# Patient Record
Sex: MALE | Race: WHITE | NOT HISPANIC OR LATINO | URBAN - NONMETROPOLITAN AREA
[De-identification: names, ages, dates, MRNs, and addresses within clinical notes are randomized per-mention and may not be internally consistent; named-entity substitution may affect disease eponyms.]

---

## 2018-11-21 ENCOUNTER — OPTICAL OFFICE (OUTPATIENT)
Dept: URBAN - NONMETROPOLITAN AREA CLINIC 4 | Facility: CLINIC | Age: 43
Setting detail: OPHTHALMOLOGY
End: 2018-11-21
Payer: COMMERCIAL

## 2018-11-21 ENCOUNTER — DOCTOR'S OFFICE (OUTPATIENT)
Dept: URBAN - NONMETROPOLITAN AREA CLINIC 1 | Facility: CLINIC | Age: 43
Setting detail: OPHTHALMOLOGY
End: 2018-11-21
Payer: COMMERCIAL

## 2018-11-21 DIAGNOSIS — Z01.00: ICD-10-CM

## 2018-11-21 DIAGNOSIS — H52.13: ICD-10-CM

## 2018-11-21 DIAGNOSIS — H52.223: ICD-10-CM

## 2018-11-21 PROCEDURE — 92004 COMPRE OPH EXAM NEW PT 1/>: CPT | Performed by: OPTOMETRIST

## 2018-11-21 PROCEDURE — V2020 VISION SVCS FRAMES PURCHASES: HCPCS | Performed by: OPTOMETRIST

## 2018-11-21 PROCEDURE — V2203 LENS SPHCYL BIFOCAL 4.00D/.1: HCPCS | Performed by: OPTOMETRIST

## 2018-11-21 ASSESSMENT — REFRACTION_AUTOREFRACTION
OD_CYLINDER: -0.25
OD_SPHERE: -0.50
OD_AXIS: 062
OS_SPHERE: -0.75
OS_AXIS: 132
OS_CYLINDER: -0.25

## 2018-11-21 ASSESSMENT — REFRACTION_MANIFEST
OU_VA: 20/
OS_VA2: 20/20-2
OD_VA3: 20/
OD_VA1: 20/20-2
OS_VA1: 20/
OD_CYLINDER: -0.25
OD_VA2: 20/20-2
OS_VA3: 20/
OS_ADD: +1.25
OS_AXIS: 130
OD_SPHERE: -0.50
OS_CYLINDER: -0.25
OS_VA1: 20/20-2
OS_VA3: 20/
OU_VA: 20/
OD_ADD: +1.25
OS_SPHERE: -0.75
OD_AXIS: 060
OD_VA3: 20/
OS_VA2: 20/
OD_VA2: 20/
OD_VA1: 20/

## 2018-11-21 ASSESSMENT — REFRACTION_CURRENTRX
OS_OVR_VA: 20/
OD_OVR_VA: 20/
OD_OVR_VA: 20/
OS_OVR_VA: 20/
OD_OVR_VA: 20/
OS_OVR_VA: 20/

## 2018-11-21 ASSESSMENT — VISUAL ACUITY
OD_BCVA: 20/40-1
OS_BCVA: 20/25

## 2018-11-21 ASSESSMENT — SPHEQUIV_DERIVED
OS_SPHEQUIV: -0.875
OD_SPHEQUIV: -0.625
OD_SPHEQUIV: -0.625
OS_SPHEQUIV: -0.875

## 2018-11-21 ASSESSMENT — CONFRONTATIONAL VISUAL FIELD TEST (CVF)
OS_FINDINGS: FULL
OD_FINDINGS: FULL

## 2020-09-11 ENCOUNTER — DOCTOR'S OFFICE (OUTPATIENT)
Dept: URBAN - NONMETROPOLITAN AREA CLINIC 1 | Facility: CLINIC | Age: 45
Setting detail: OPHTHALMOLOGY
End: 2020-09-11
Payer: COMMERCIAL

## 2020-09-11 ENCOUNTER — OPTICAL OFFICE (OUTPATIENT)
Dept: URBAN - NONMETROPOLITAN AREA CLINIC 4 | Facility: CLINIC | Age: 45
Setting detail: OPHTHALMOLOGY
End: 2020-09-11
Payer: COMMERCIAL

## 2020-09-11 ENCOUNTER — RX ONLY (RX ONLY)
Age: 45
End: 2020-09-11

## 2020-09-11 DIAGNOSIS — H52.13: ICD-10-CM

## 2020-09-11 DIAGNOSIS — H52.223: ICD-10-CM

## 2020-09-11 DIAGNOSIS — Z01.00: ICD-10-CM

## 2020-09-11 DIAGNOSIS — H52.4: ICD-10-CM

## 2020-09-11 PROCEDURE — 92014 COMPRE OPH EXAM EST PT 1/>: CPT | Performed by: OPTOMETRIST

## 2020-09-11 PROCEDURE — 92015 DETERMINE REFRACTIVE STATE: CPT | Performed by: OPTOMETRIST

## 2020-09-11 PROCEDURE — V2203 LENS SPHCYL BIFOCAL 4.00D/.1: HCPCS | Performed by: OPTOMETRIST

## 2020-09-11 PROCEDURE — V2020 VISION SVCS FRAMES PURCHASES: HCPCS | Performed by: OPTOMETRIST

## 2020-09-11 ASSESSMENT — REFRACTION_MANIFEST
OD_CYLINDER: -0.25
OD_VA2: 20/20-2
OS_CYLINDER: -0.25
OS_VA2: 20/20-2
OD_SPHERE: -0.50
OS_VA1: 20/20-2
OS_SPHERE: -0.75
OS_AXIS: 130
OD_AXIS: 060
OD_VA1: 20/20-2
OS_ADD: +1.50
OD_ADD: +1.50

## 2020-09-11 ASSESSMENT — VISUAL ACUITY
OD_BCVA: 20/60
OS_BCVA: 20/20-2

## 2020-09-11 ASSESSMENT — REFRACTION_AUTOREFRACTION
OS_AXIS: 180
OS_CYLINDER: 0.00
OS_SPHERE: -0.75
OD_CYLINDER: 0.00
OD_SPHERE: -0.50
OD_AXIS: 180

## 2020-09-11 ASSESSMENT — SPHEQUIV_DERIVED
OS_SPHEQUIV: -0.875
OD_SPHEQUIV: -0.5
OD_SPHEQUIV: -0.625
OS_SPHEQUIV: -0.75

## 2020-09-11 ASSESSMENT — CONFRONTATIONAL VISUAL FIELD TEST (CVF)
OD_FINDINGS: FULL
OS_FINDINGS: FULL

## 2021-04-08 DIAGNOSIS — Z23 ENCOUNTER FOR IMMUNIZATION: ICD-10-CM

## 2022-08-16 ENCOUNTER — OFFICE VISIT (OUTPATIENT)
Dept: URGENT CARE | Facility: CLINIC | Age: 47
End: 2022-08-16
Payer: COMMERCIAL

## 2022-08-16 VITALS
OXYGEN SATURATION: 97 % | BODY MASS INDEX: 22.22 KG/M2 | WEIGHT: 150 LBS | DIASTOLIC BLOOD PRESSURE: 90 MMHG | RESPIRATION RATE: 18 BRPM | TEMPERATURE: 97.7 F | SYSTOLIC BLOOD PRESSURE: 138 MMHG | HEART RATE: 84 BPM | HEIGHT: 69 IN

## 2022-08-16 DIAGNOSIS — K04.7 DENTAL INFECTION: Primary | ICD-10-CM

## 2022-08-16 PROCEDURE — 99213 OFFICE O/P EST LOW 20 MIN: CPT | Performed by: PHYSICIAN ASSISTANT

## 2022-08-16 RX ORDER — AMOXICILLIN 500 MG/1
500 CAPSULE ORAL EVERY 12 HOURS SCHEDULED
Qty: 20 CAPSULE | Refills: 0 | Status: SHIPPED | OUTPATIENT
Start: 2022-08-16 | End: 2022-08-26

## 2022-08-16 NOTE — PATIENT INSTRUCTIONS
Take antibiotic as prescribed  Complete full dose of antibiotics even if symptoms begin to improve or resolve  May use OTC Tylenol for fever  Observe for signs of worsening infection including increased swelling, redness, pain, discharge, fever or chills, or persistent symptoms  Your symptoms should begin to improve over the next couple days  Follow-up with your PCP in 3-5 days if symptoms worsen or do not improve  Go to ER if symptoms become severe

## 2022-08-16 NOTE — LETTER
August 16, 2022     Patient: Dane Corbin   YOB: 1975   Date of Visit: 8/16/2022       To Whom It May Concern: It is my medical opinion that Dane Corbin may return to work on 8/18/2022             Sincerely,        Anya Ramirez PA-C

## 2022-08-16 NOTE — PROGRESS NOTES
[facility]        NAME: Ed Patel is a 51 y o  male  : 1975    MRN: [id number]  DATE: 2022  TIME: 9:10 AM    Assessment and Plan   Dental infection [K04 7]  1  Dental infection  amoxicillin (AMOXIL) 500 mg capsule     Offer dental block or Toradol injection but patient declined  Patient Instructions   Take antibiotic as prescribed  Complete full dose even symptoms began to improve  Tylenol ibuprofen  Over-the-counter temporary filling  Chew on the opposite side  Keep appoint with a dentist     Follow up with PCP in 3-5 days  Proceed to  ER if symptoms worsen  Chief Complaint     Chief Complaint   Patient presents with    Dental Pain     Symptoms started a week ago with left side dental pain today he comes in with fascial swelling of the left side of his face          History of Present Illness       Patient is a 58-year-old male with no significant past medical history presents the office complaining of dental pain and facial swelling since yesterday  Pain is located to the left lower tooth  Patient admits he has poor dentition and has had problems with this tooth before  Denies fevers or chills  Pain is rated 5/10 and is manageable with Tylenol  Reports he has an appointment in 2 weeks for the dentist       Review of Systems   Review of Systems   Constitutional: Negative for chills and fever  HENT: Positive for dental problem, ear pain and facial swelling  Negative for trouble swallowing            Current Medications       Current Outpatient Medications:     amoxicillin (AMOXIL) 500 mg capsule, Take 1 capsule (500 mg total) by mouth every 12 (twelve) hours for 10 days, Disp: 20 capsule, Rfl: 0    Current Allergies     Allergies as of 2022    (No Known Allergies)            The following portions of the patient's history were reviewed and updated as appropriate: allergies, current medications, past family history, past medical history, past social history, past surgical history and problem list      History reviewed  No pertinent past medical history  Past Surgical History:   Procedure Laterality Date    WISDOM TOOTH EXTRACTION         History reviewed  No pertinent family history  Medications have been verified  Objective   /90   Pulse 84   Temp 97 7 °F (36 5 °C)   Resp 18   Ht 5' 9" (1 753 m)   Wt 68 kg (150 lb)   SpO2 97%   BMI 22 15 kg/m²   No LMP for male patient  Physical Exam     Physical Exam  Vitals and nursing note reviewed  Constitutional:       Appearance: Normal appearance  He is well-developed  HENT:      Head: Normocephalic and atraumatic  Right Ear: Tympanic membrane, ear canal and external ear normal       Left Ear: Tympanic membrane, ear canal and external ear normal       Nose: Nose normal       Mouth/Throat:      Dentition: Abnormal dentition  Dental tenderness and dental caries present  No dental abscesses  Tongue: No lesions  Pharynx: Oropharynx is clear  Uvula midline  Comments: Poor dentition with various cavities  Eyes:      General: Lids are normal       Conjunctiva/sclera: Conjunctivae normal       Pupils: Pupils are equal, round, and reactive to light  Cardiovascular:      Rate and Rhythm: Normal rate and regular rhythm  Heart sounds: Normal heart sounds  No murmur heard  No friction rub  No gallop  Pulmonary:      Effort: Pulmonary effort is normal       Breath sounds: Normal breath sounds  No stridor  No wheezing or rales  Musculoskeletal:         General: Normal range of motion  Cervical back: Neck supple  Skin:     General: Skin is warm and dry  Capillary Refill: Capillary refill takes less than 2 seconds  Neurological:      Mental Status: He is alert

## 2023-02-08 ENCOUNTER — OFFICE VISIT (OUTPATIENT)
Dept: URGENT CARE | Facility: CLINIC | Age: 48
End: 2023-02-08

## 2023-02-08 VITALS
SYSTOLIC BLOOD PRESSURE: 134 MMHG | DIASTOLIC BLOOD PRESSURE: 91 MMHG | TEMPERATURE: 98.7 F | HEIGHT: 68 IN | RESPIRATION RATE: 18 BRPM | BODY MASS INDEX: 23.95 KG/M2 | WEIGHT: 158 LBS | HEART RATE: 114 BPM | OXYGEN SATURATION: 97 %

## 2023-02-08 DIAGNOSIS — K04.7 DENTAL ABSCESS: Primary | ICD-10-CM

## 2023-02-08 RX ORDER — AMOXICILLIN 875 MG/1
875 TABLET, COATED ORAL 2 TIMES DAILY
Qty: 20 TABLET | Refills: 0 | Status: SHIPPED | OUTPATIENT
Start: 2023-02-08 | End: 2023-02-18

## 2023-02-08 NOTE — PATIENT INSTRUCTIONS
Dental Abscess   WHAT YOU NEED TO KNOW:   A dental abscess is a collection of pus in or around a tooth  A dental abscess is caused by bacteria  The bacteria can enter the tooth when the enamel (outer part of the tooth) is damaged by tooth decay  Bacteria can also enter the tooth through a chip in the tooth or a cut in the gum  Food particles that are stuck between the teeth for a long time may also lead to an abscess  DISCHARGE INSTRUCTIONS:   Return to the emergency department if:   You have severe pain in your tooth or jaw  You have trouble breathing because of pain or swelling  Call your doctor if:   Your symptoms get worse, even after treatment  Your mouth is bleeding  You cannot eat or drink because of pain or swelling  Your abscess returns  You have an injury that causes a crack in your tooth  You have questions or concerns about your condition or care  Medicines: You may  need any of the following:  Antibiotics  help treat a bacterial infection  NSAIDs , such as ibuprofen, help decrease swelling, pain, and fever  This medicine is available with or without a doctor's order  NSAIDs can cause stomach bleeding or kidney problems in certain people  If you take blood thinner medicine, always ask your healthcare provider if NSAIDs are safe for you  Always read the medicine label and follow directions  Acetaminophen  decreases pain and fever  It is available without a doctor's order  Ask how much to take and how often to take it  Follow directions  Read the labels of all other medicines you are using to see if they also contain acetaminophen, or ask your doctor or pharmacist  Acetaminophen can cause liver damage if not taken correctly  Do not use more than 4 grams (4,000 milligrams) total of acetaminophen in one day  Prescription pain medicine  may be given  Ask your healthcare provider how to take this medicine safely  Some prescription pain medicines contain acetaminophen  Do not take other medicines that contain acetaminophen without talking to your healthcare provider  Too much acetaminophen may cause liver damage  Prescription pain medicine may cause constipation  Ask your healthcare provider how to prevent or treat constipation  Take your medicine as directed  Contact your healthcare provider if you think your medicine is not helping or if you have side effects  Tell him of her if you are allergic to any medicine  Keep a list of the medicines, vitamins, and herbs you take  Include the amounts, and when and why you take them  Bring the list or the pill bottles to follow-up visits  Carry your medicine list with you in case of an emergency  Self-care:   Rinse your mouth every 2 hours with salt water  This will help keep the area clean  Gently brush your teeth twice a day with a soft tooth brush  This will help keep the area clean  Eat soft foods as directed  Soft foods may cause less pain  Examples include applesauce, yogurt, and cooked pasta  Ask your healthcare provider how long to follow this instruction  Apply a warm compress to your tooth or gum  Use a cotton ball or gauze soaked in warm water  Remove the compress in 10 minutes or when it becomes cool  Repeat 3 times a day  Prevent another abscess:   Brush your teeth at least 2 times a day  with fluoride toothpaste  Use dental floss at least once a day  to clean between your teeth  Rinse your mouth with water or mouthwash  after meals and snacks  Chew sugarless gum  Avoid sugary and starchy food that can stick between your teeth  Limit drinks high in sugar, such as soda or fruit juice  See your dentist every 6 months  for dental cleanings and oral exams  Follow up with your doctor or dentist in 24 hours, or as directed: Your healthcare provider will need to check your teeth and gums  Write down your questions so you remember to ask them during your visits     © Copyright [employer] 2022 Information is for End User's use only and may not be sold, redistributed or otherwise used for commercial purposes  All illustrations and images included in CareNotes® are the copyrighted property of [employer]  or [employer]  The above information is an  only  It is not intended as medical advice for individual conditions or treatments  Talk to your doctor, nurse or pharmacist before following any medical regimen to see if it is safe and effective for you

## 2023-02-08 NOTE — LETTER
February 8, 2023     Patient: Emma Moody   YOB: 1975   Date of Visit: 2/8/2023       To Whom it May Concern:    Emma Moody was seen in my clinic on 2/8/2023  He may return to work on 2/9/2023  If you have any questions or concerns, please don't hesitate to call           Sincerely,          Tiffanie Perla PA-C        CC: No Recipients

## 2023-02-14 NOTE — PROGRESS NOTES
[facility]        NAME: Tierra Gutierrez is a 49 y o  male  : 1975    MRN: [id number]  DATE: 2023  TIME: 9:56 AM    Assessment and Plan   Dental abscess [K04 7]  1  Dental abscess  amoxicillin (AMOXIL) 875 mg tablet            Patient Instructions     Pt has dental abscess which I will treat with Amox and discussed pain control, need to get in with dentist ASAP  Follow up with PCP in 3-5 days  Proceed to  ER if symptoms worsen  Chief Complaint     Chief Complaint   Patient presents with   • Dental Swelling     Patients reports left lower dental swelling for the past 3 days, has a dental clinic however has to schedule an appt  History of Present Illness       Pt presents with acute pain, swelling left lower gingiva x 3 days  Concerned about possible infection/abscess  Denies purulent drainage, bleeding, fever, chills  Has some left sided facial pain and swelling  Review of Systems   Review of Systems   Constitutional: Negative  HENT: Positive for dental problem and facial swelling  Respiratory: Negative  Cardiovascular: Negative  Gastrointestinal: Negative  Genitourinary: Negative  Current Medications       Current Outpatient Medications:   •  amoxicillin (AMOXIL) 875 mg tablet, Take 1 tablet (875 mg total) by mouth 2 (two) times a day for 10 days, Disp: 20 tablet, Rfl: 0    Current Allergies     Allergies as of 2023   • (No Known Allergies)            The following portions of the patient's history were reviewed and updated as appropriate: allergies, current medications, past family history, past medical history, past social history, past surgical history and problem list      History reviewed  No pertinent past medical history  Past Surgical History:   Procedure Laterality Date   • WISDOM TOOTH EXTRACTION         History reviewed  No pertinent family history  Medications have been verified          Objective   /91   Pulse (!) 114   Temp 98 7 °F (37 1 °C)   Resp 18   Ht 5' 8" (1 727 m)   Wt 71 7 kg (158 lb)   SpO2 97%   BMI 24 02 kg/m²   No LMP for male patient  Physical Exam     Physical Exam  Vitals reviewed  Constitutional:       General: He is not in acute distress  Appearance: He is well-developed  HENT:      Mouth/Throat:      Mouth: Mucous membranes are moist       Pharynx: Oropharynx is clear  Comments: Localized swelling left lower gingiva but no active bleeding or purulent drainage  External facial STS noted over the mandible with TTP  Neurological:      Mental Status: He is alert and oriented to person, place, and time

## 2025-07-15 ENCOUNTER — OFFICE VISIT (OUTPATIENT)
Dept: URGENT CARE | Facility: CLINIC | Age: 50
End: 2025-07-15
Payer: COMMERCIAL

## 2025-07-15 VITALS
BODY MASS INDEX: 24.25 KG/M2 | DIASTOLIC BLOOD PRESSURE: 93 MMHG | TEMPERATURE: 96.2 F | HEART RATE: 67 BPM | SYSTOLIC BLOOD PRESSURE: 163 MMHG | HEIGHT: 68 IN | OXYGEN SATURATION: 99 % | WEIGHT: 160 LBS | RESPIRATION RATE: 16 BRPM

## 2025-07-15 DIAGNOSIS — K04.7 DENTAL INFECTION: Primary | ICD-10-CM

## 2025-07-15 PROCEDURE — S9083 URGENT CARE CENTER GLOBAL: HCPCS

## 2025-07-15 PROCEDURE — G0382 LEV 3 HOSP TYPE B ED VISIT: HCPCS

## 2025-07-15 NOTE — PROGRESS NOTES
[facility]        NAME: Waqar Pierce is a 49 y.o. male  : 1975    MRN: [id number]  DATE: July 15, 2025  TIME: 4:27 PM    Assessment and Plan   Dental infection [K04.7]  1. Dental infection  amoxicillin-clavulanate (AUGMENTIN) 875-125 mg per tablet        Plan to treat with course of oral antibiotic at this time based on physical examination findings with broken tooth present.  Unable to administer Toradol injection IM in the clinic today given that last dose of ibuprofen was approximately 3 hours ago.  Peridex mouthwash offered however patient declines.  Patient declines referral to dentistry today, stating that he has a dentist.  Strongly encouraged patient to follow-up with his dentist for his current problem. Tylenol/ibuprofen for pain/fever. Increased fluids & rest. May continue with other OTC and supportive therapies at home. Encouraged to follow up closely with family physician or healthcare provider. ED precautions provided/discussed. Patient in agreement with plan & verbalized understanding.      Patient Instructions   Take antibiotics as prescribed, being sure to complete full course of therapy even if you feel better!    Eat yogurt with live and active cultures and/or take a probiotic at least 3 hours before or after antibiotic dose. Monitor stool for diarrhea and/or blood. If this occurs, contact primary care doctor ASAP.      Follow up with dentist  Salt water gargles  Ice over area  Ibuprofen 600-800mg + Tylenol 1000mg every 6 hours. Do not exceed this amount.    Follow up with PCP in 3-5 days.  Proceed to  ER if symptoms worsen.    If tests have been performed at [facility], our office will contact you with results if changes need to be made to the care plan discussed with you at the visit.  [facility]    Chief Complaint     Chief Complaint   Patient presents with    Dental Pain     Right lower dental pain started 2 days ago.          History  "of Present Illness       Patient is a 49-year-old male who presents today for evaluation of right lower dental pain ongoing x 2 days.  He reports that he is experiencing increasing pain with both eating and drinking.  Denies any bleeding/drainage from the affected teeth.  He has been taking Advil \"around-the-clock\" with mild relief of symptoms only.  He states that he has not followed up with his dentist yet.  Denies any difficulty breathing, chest pain, nausea, vomiting, jaw swelling.       Dental Pain   This is a new problem. The current episode started in the past 7 days. The problem occurs constantly. The problem has been unchanged. The pain is at a severity of 5/10. The pain is moderate. Pertinent negatives include no difficulty swallowing, facial pain, fever, oral bleeding, sinus pressure or thermal sensitivity. He has tried NSAIDs (advil) for the symptoms. The treatment provided mild relief.       Review of Systems   Review of Systems   Constitutional:  Positive for appetite change (d/t pain with eating/drinking). Negative for activity change, chills, diaphoresis, fatigue and fever.   HENT:  Positive for dental problem. Negative for congestion, ear discharge, ear pain, facial swelling, mouth sores, postnasal drip, rhinorrhea, sinus pressure, sinus pain, sore throat, trouble swallowing and voice change.    Respiratory:  Negative for cough, chest tightness, shortness of breath, wheezing and stridor.    Cardiovascular:  Negative for chest pain.   Gastrointestinal:  Negative for abdominal pain, nausea and vomiting.   Musculoskeletal:  Negative for arthralgias, back pain, gait problem, joint swelling, myalgias, neck pain and neck stiffness.   Skin:  Negative for color change, pallor, rash and wound.   Neurological:  Negative for dizziness, weakness, light-headedness and headaches.   Hematological:  Negative for adenopathy.         Current Medications     Current Medications[1]    Current Allergies     Allergies as " "of 07/15/2025    (No Known Allergies)            The following portions of the patient's history were reviewed and updated as appropriate: allergies, current medications, past family history, past medical history, past social history, past surgical history and problem list.     Past Medical History[2]    Past Surgical History[3]    Family History[4]      Medications have been verified.        Objective   /93   Pulse 67   Temp (!) 96.2 °F (35.7 °C)   Resp 16   Ht 5' 8\" (1.727 m)   Wt 72.6 kg (160 lb)   SpO2 99%   BMI 24.33 kg/m²   No LMP for male patient.       Physical Exam     Physical Exam  Vitals and nursing note reviewed.   Constitutional:       General: He is awake. He is not in acute distress.     Appearance: Normal appearance. He is ill-appearing. He is not toxic-appearing or diaphoretic.   HENT:      Head: Normocephalic and atraumatic.      Jaw: There is normal jaw occlusion. Tenderness and pain on movement present. No trismus, swelling or malocclusion.      Right Ear: Tympanic membrane, ear canal and external ear normal.      Left Ear: Tympanic membrane, ear canal and external ear normal.      Nose: Nose normal.      Mouth/Throat:      Lips: Pink. No lesions.      Mouth: Mucous membranes are moist.      Dentition: Abnormal dentition. Dental tenderness, gingival swelling and dental caries present. No dental abscesses or gum lesions.      Tongue: No lesions. Tongue does not deviate from midline.      Pharynx: Oropharynx is clear. Uvula midline. No pharyngeal swelling, oropharyngeal exudate, posterior oropharyngeal erythema or uvula swelling.       Eyes:      Extraocular Movements: Extraocular movements intact.      Conjunctiva/sclera: Conjunctivae normal.      Pupils: Pupils are equal, round, and reactive to light.       Cardiovascular:      Rate and Rhythm: Normal rate and regular rhythm.      Pulses: Normal pulses.      Heart sounds: Normal heart sounds.   Pulmonary:      Effort: Pulmonary " effort is normal. No respiratory distress.      Breath sounds: Normal breath sounds. No stridor. No wheezing, rhonchi or rales.   Chest:      Chest wall: No tenderness.     Musculoskeletal:         General: Normal range of motion.      Cervical back: Normal range of motion and neck supple. No rigidity or tenderness.   Lymphadenopathy:      Cervical: No cervical adenopathy.     Skin:     General: Skin is warm.      Capillary Refill: Capillary refill takes less than 2 seconds.      Coloration: Skin is not jaundiced or pale.      Findings: No bruising, erythema, lesion or rash.     Neurological:      General: No focal deficit present.      Mental Status: He is alert.      Sensory: No sensory deficit.      Motor: No weakness.      Gait: Gait normal.     Psychiatric:         Mood and Affect: Mood normal.         Behavior: Behavior normal. Behavior is cooperative.         Thought Content: Thought content normal.         Judgment: Judgment normal.                        [1]   Current Outpatient Medications:     amoxicillin-clavulanate (AUGMENTIN) 875-125 mg per tablet, Take 1 tablet by mouth every 12 (twelve) hours for 7 days, Disp: 14 tablet, Rfl: 0  [2] No past medical history on file.  [3]   Past Surgical History:  Procedure Laterality Date    WISDOM TOOTH EXTRACTION     [4] No family history on file.

## 2025-08-01 ENCOUNTER — OFFICE VISIT (OUTPATIENT)
Dept: URGENT CARE | Facility: CLINIC | Age: 50
End: 2025-08-01
Payer: COMMERCIAL

## 2025-08-01 VITALS
OXYGEN SATURATION: 97 % | WEIGHT: 178.8 LBS | DIASTOLIC BLOOD PRESSURE: 98 MMHG | HEIGHT: 68 IN | SYSTOLIC BLOOD PRESSURE: 135 MMHG | TEMPERATURE: 97 F | BODY MASS INDEX: 27.1 KG/M2 | RESPIRATION RATE: 18 BRPM | HEART RATE: 89 BPM

## 2025-08-01 DIAGNOSIS — K08.89 PAIN, DENTAL: Primary | ICD-10-CM

## 2025-08-01 PROCEDURE — G0382 LEV 3 HOSP TYPE B ED VISIT: HCPCS | Performed by: PHYSICIAN ASSISTANT

## 2025-08-01 PROCEDURE — S9083 URGENT CARE CENTER GLOBAL: HCPCS | Performed by: PHYSICIAN ASSISTANT

## 2025-08-01 RX ORDER — LIDOCAINE HYDROCHLORIDE 20 MG/ML
5 SOLUTION OROPHARYNGEAL 4 TIMES DAILY PRN
Qty: 100 ML | Refills: 0 | Status: SHIPPED | OUTPATIENT
Start: 2025-08-01

## 2025-08-01 RX ORDER — IBUPROFEN 800 MG/1
800 TABLET, FILM COATED ORAL EVERY 8 HOURS PRN
Qty: 30 TABLET | Refills: 0 | Status: SHIPPED | OUTPATIENT
Start: 2025-08-01

## 2025-08-01 RX ORDER — AMOXICILLIN 875 MG/1
875 TABLET, COATED ORAL 2 TIMES DAILY
Qty: 14 TABLET | Refills: 0 | Status: SHIPPED | OUTPATIENT
Start: 2025-08-01 | End: 2025-08-08